# Patient Record
Sex: MALE | Race: WHITE | Employment: FULL TIME | URBAN - METROPOLITAN AREA
[De-identification: names, ages, dates, MRNs, and addresses within clinical notes are randomized per-mention and may not be internally consistent; named-entity substitution may affect disease eponyms.]

---

## 2024-05-03 ENCOUNTER — OFFICE VISIT (OUTPATIENT)
Age: 54
End: 2024-05-03

## 2024-05-03 VITALS
HEIGHT: 74 IN | BODY MASS INDEX: 35.16 KG/M2 | HEART RATE: 86 BPM | RESPIRATION RATE: 16 BRPM | OXYGEN SATURATION: 98 % | SYSTOLIC BLOOD PRESSURE: 125 MMHG | TEMPERATURE: 98.5 F | WEIGHT: 274 LBS | DIASTOLIC BLOOD PRESSURE: 85 MMHG

## 2024-05-03 DIAGNOSIS — G89.29 CHRONIC PAIN OF RIGHT KNEE: Primary | ICD-10-CM

## 2024-05-03 DIAGNOSIS — Z12.5 PROSTATE CANCER SCREENING: ICD-10-CM

## 2024-05-03 DIAGNOSIS — Z76.89 ENCOUNTER TO ESTABLISH CARE: ICD-10-CM

## 2024-05-03 DIAGNOSIS — H57.9: ICD-10-CM

## 2024-05-03 DIAGNOSIS — E29.1 MALE HYPOGONADISM: ICD-10-CM

## 2024-05-03 DIAGNOSIS — M25.561 CHRONIC PAIN OF RIGHT KNEE: Primary | ICD-10-CM

## 2024-05-03 DIAGNOSIS — E78.5 HYPERLIPIDEMIA, UNSPECIFIED HYPERLIPIDEMIA TYPE: ICD-10-CM

## 2024-05-03 DIAGNOSIS — F32.5 MAJOR DEPRESSIVE DISORDER WITH SINGLE EPISODE, IN FULL REMISSION (HCC): ICD-10-CM

## 2024-05-03 DIAGNOSIS — Z00.00 ADULT GENERAL MEDICAL EXAM: ICD-10-CM

## 2024-05-03 RX ORDER — ATORVASTATIN CALCIUM 20 MG/1
20 TABLET, FILM COATED ORAL DAILY
Qty: 90 TABLET | Refills: 3 | Status: SHIPPED | OUTPATIENT
Start: 2024-05-03

## 2024-05-03 RX ORDER — PAROXETINE HYDROCHLORIDE 40 MG/1
40 TABLET, FILM COATED ORAL EVERY MORNING
COMMUNITY
End: 2024-05-03 | Stop reason: SDUPTHER

## 2024-05-03 RX ORDER — CELECOXIB 200 MG/1
200 CAPSULE ORAL DAILY
Qty: 90 CAPSULE | Refills: 3 | Status: SHIPPED | OUTPATIENT
Start: 2024-05-03 | End: 2025-04-28

## 2024-05-03 RX ORDER — PAROXETINE HYDROCHLORIDE 40 MG/1
40 TABLET, FILM COATED ORAL EVERY MORNING
Qty: 90 TABLET | Refills: 3 | Status: SHIPPED | OUTPATIENT
Start: 2024-05-03

## 2024-05-03 RX ORDER — ATORVASTATIN CALCIUM 20 MG/1
20 TABLET, FILM COATED ORAL DAILY
COMMUNITY
End: 2024-05-03 | Stop reason: SDUPTHER

## 2024-05-03 RX ORDER — CELECOXIB 200 MG/1
200 CAPSULE ORAL DAILY
COMMUNITY
End: 2024-05-03 | Stop reason: SDUPTHER

## 2024-05-03 SDOH — ECONOMIC STABILITY: FOOD INSECURITY: WITHIN THE PAST 12 MONTHS, THE FOOD YOU BOUGHT JUST DIDN'T LAST AND YOU DIDN'T HAVE MONEY TO GET MORE.: NEVER TRUE

## 2024-05-03 SDOH — ECONOMIC STABILITY: FOOD INSECURITY: WITHIN THE PAST 12 MONTHS, YOU WORRIED THAT YOUR FOOD WOULD RUN OUT BEFORE YOU GOT MONEY TO BUY MORE.: NEVER TRUE

## 2024-05-03 SDOH — ECONOMIC STABILITY: HOUSING INSECURITY
IN THE LAST 12 MONTHS, WAS THERE A TIME WHEN YOU DID NOT HAVE A STEADY PLACE TO SLEEP OR SLEPT IN A SHELTER (INCLUDING NOW)?: NO

## 2024-05-03 SDOH — ECONOMIC STABILITY: INCOME INSECURITY: HOW HARD IS IT FOR YOU TO PAY FOR THE VERY BASICS LIKE FOOD, HOUSING, MEDICAL CARE, AND HEATING?: NOT HARD AT ALL

## 2024-05-03 ASSESSMENT — PATIENT HEALTH QUESTIONNAIRE - PHQ9
SUM OF ALL RESPONSES TO PHQ QUESTIONS 1-9: 0
SUM OF ALL RESPONSES TO PHQ9 QUESTIONS 1 & 2: 0
1. LITTLE INTEREST OR PLEASURE IN DOING THINGS: NOT AT ALL
SUM OF ALL RESPONSES TO PHQ QUESTIONS 1-9: 0
2. FEELING DOWN, DEPRESSED OR HOPELESS: NOT AT ALL

## 2024-05-03 ASSESSMENT — ENCOUNTER SYMPTOMS
BLOOD IN STOOL: 0
DIARRHEA: 0
SHORTNESS OF BREATH: 0
ABDOMINAL PAIN: 0
COUGH: 0
VOMITING: 0
CONSTIPATION: 0
NAUSEA: 0

## 2024-05-03 NOTE — PROGRESS NOTES
Justo Raya is a 53 y.o. year old male who is a new patient to me today (05/03/24).  He was previously followed by Dr Young at [facility].      Assessment & Plan:   1. Chronic pain of right knee  -     celecoxib (CELEBREX) 200 MG capsule; Take 1 capsule by mouth daily, Disp-90 capsule, R-3Normal  -     [facility] - Belkis Jon MD, Orthopedic Surgery (knee), Fabio ([facility])  2. Major depressive disorder with single episode, in full remission ([facility])  Assessment & Plan:   Well-controlled, continue current medications  Orders:  -     PARoxetine (PAXIL) 40 MG tablet; Take 1 tablet by mouth every morning, Disp-90 tablet, R-3Normal  3. Hyperlipidemia, unspecified hyperlipidemia type  -     atorvastatin (LIPITOR) 20 MG tablet; Take 1 tablet by mouth daily, Disp-90 tablet, R-3Normal  -     Lipid Panel; Future  4. Male hypogonadism  Assessment & Plan:  Will add testosterone level onto labs today. Advised patient that I do not manage hormone replacement - also referred to [facility] to get established  Orders:  -     Testosterone, free, total; Future  -     [facility] - Jonah De Leon MD, UrologyFabio ([facility])  5. Prostate cancer screening  -     PSA Screening; Future  6. Adult general medical exam  -     Comprehensive Metabolic Panel; Future  -     CBC; Future  7. Disorder of extraocular muscle  Assessment & Plan:  Referred to [facility] for further evaluation  8. Encounter to establish care        Return in about 1 year (around 5/3/2025) for Annual Physical.      Subjective:   Patient is here today for evaluation of the following chief complaint(s): New Patient and Eye Problem (Left eye get stuck to the left )    Depression/Anxiety  - Has been on Paxil for several years, says that he feels it controls his symptoms well    DDD  - Lower back  - Has been on NSAIDs for several years    Chronic right knee pain  - He says that he has had his meniscus repaired in his left knee several years ago and

## 2024-05-03 NOTE — PROGRESS NOTES
HMR Product Prescription Guidelines  Decision Free (LCD) 3 + 2 option  800 enrike/day      Basic Prescription Shake Substitutions Allowed More is Better Options     - Three per day     HMR Entrees- Two per day     Two HMR vitamins per day                    [x]  HMR Multigrain Hot          Cereal   One packet cereal =   One packet of         [x]   Chicken Soup  Two packets soup =   one packet of      [x]  Any HMR Shakes    [x]   Chicken Soup    [x]  HMR 70 Plus Puddings     [x]  HMR Entrees & BeneFit          Bars    [x]  HMR Multigrain Hot          Cereal       @-See Product Restrictions for Special Medical Conditions      Name: Denis Grimes  Initial Weight 322.5/BMI:52.85   Start Date:4/2/2018 Height:    Ht Readings from Last 1 Encounters:   03/22/18 5' 5.5\" (1.664 m)      Program:  HMR Goal Weight:     Diet prescription:  DF 3+2 800 shakes Ideal Body Weight:        Date Week Weight Change in lbs Total Weight loss to date Comments Staff    3/22/2018 0 322.5                        Verified name and birth date for privacy precautions.   Chart reviewed in preparation for today's visit.     Chief Complaint   Patient presents with    New Patient    Eye Problem     Left eye get stuck to the left           Health Maintenance Due   Topic    Hepatitis B vaccine (1 of 3 - 3-dose series)    COVID-19 Vaccine (1)    Lipids     Depression Screen     HIV screen     Hepatitis C screen     DTaP/Tdap/Td vaccine (1 - Tdap)    Diabetes screen     Colorectal Cancer Screen     Shingles vaccine (1 of 2)         Wt Readings from Last 3 Encounters:   05/03/24 124.3 kg (274 lb)     Temp Readings from Last 3 Encounters:   05/03/24 98.5 °F (36.9 °C) (Oral)     BP Readings from Last 3 Encounters:   05/03/24 125/85     Pulse Readings from Last 3 Encounters:   05/03/24 86       Social Determinants of Health     Tobacco Use: Low Risk  (5/3/2024)    Patient History     Smoking Tobacco Use: Never     Smokeless Tobacco Use: Never     Passive Exposure: Not on file   Alcohol Use: Not on file   Financial Resource Strain: Low Risk  (5/3/2024)    Overall Financial Resource Strain (CARDIA)     Difficulty of Paying Living Expenses: Not hard at all   Food Insecurity: No Food Insecurity (5/3/2024)    Hunger Vital Sign     Worried About Running Out of Food in the Last Year: Never true     Ran Out of Food in the Last Year: Never true   Transportation Needs: Unknown (5/3/2024)    PRAPARE - Transportation     Lack of Transportation (Medical): Not on file     Lack of Transportation (Non-Medical): No   Physical Activity: Not on file   Stress: Not on file   Social Connections: Not on file   Intimate Partner Violence: Not on file   Depression: Not at risk (5/3/2024)    PHQ-2     PHQ-2 Score: 0   Housing Stability: Unknown (5/3/2024)    Housing Stability Vital Sign     Unable to Pay for Housing in the Last Year: Not on file     Number of Places Lived in the Last Year: Not on file     Unstable Housing in the Last Year: No   Interpersonal

## 2024-05-13 DIAGNOSIS — E29.1 MALE HYPOGONADISM: ICD-10-CM

## 2024-05-13 DIAGNOSIS — Z00.00 ADULT GENERAL MEDICAL EXAM: ICD-10-CM

## 2024-05-13 DIAGNOSIS — E78.5 HYPERLIPIDEMIA, UNSPECIFIED HYPERLIPIDEMIA TYPE: ICD-10-CM

## 2024-05-13 DIAGNOSIS — Z12.5 PROSTATE CANCER SCREENING: ICD-10-CM

## 2024-05-13 LAB
ALBUMIN SERPL-MCNC: 3.8 G/DL (ref 3.5–5)
ALBUMIN/GLOB SERPL: 1 (ref 1.1–2.2)
ALP SERPL-CCNC: 150 U/L (ref 45–117)
ALT SERPL-CCNC: 76 U/L (ref 12–78)
ANION GAP SERPL CALC-SCNC: 6 MMOL/L (ref 5–15)
AST SERPL-CCNC: 33 U/L (ref 15–37)
BILIRUB SERPL-MCNC: 0.6 MG/DL (ref 0.2–1)
BUN SERPL-MCNC: 13 MG/DL (ref 6–20)
BUN/CREAT SERPL: 14 (ref 12–20)
CALCIUM SERPL-MCNC: 9.4 MG/DL (ref 8.5–10.1)
CHLORIDE SERPL-SCNC: 103 MMOL/L (ref 97–108)
CHOLEST SERPL-MCNC: 175 MG/DL
CO2 SERPL-SCNC: 28 MMOL/L (ref 21–32)
CREAT SERPL-MCNC: 0.94 MG/DL (ref 0.7–1.3)
ERYTHROCYTE [DISTWIDTH] IN BLOOD BY AUTOMATED COUNT: 13.2 % (ref 11.5–14.5)
GLOBULIN SER CALC-MCNC: 3.8 G/DL (ref 2–4)
GLUCOSE SERPL-MCNC: 108 MG/DL (ref 65–100)
HCT VFR BLD AUTO: 47.4 % (ref 36.6–50.3)
HDLC SERPL-MCNC: 40 MG/DL
HDLC SERPL: 4.4 (ref 0–5)
HGB BLD-MCNC: 15.4 G/DL (ref 12.1–17)
LDLC SERPL CALC-MCNC: 87.8 MG/DL (ref 0–100)
MCH RBC QN AUTO: 29.5 PG (ref 26–34)
MCHC RBC AUTO-ENTMCNC: 32.5 G/DL (ref 30–36.5)
MCV RBC AUTO: 90.8 FL (ref 80–99)
NRBC # BLD: 0 K/UL (ref 0–0.01)
NRBC BLD-RTO: 0 PER 100 WBC
PLATELET # BLD AUTO: 323 K/UL (ref 150–400)
PMV BLD AUTO: 9.7 FL (ref 8.9–12.9)
POTASSIUM SERPL-SCNC: 4.1 MMOL/L (ref 3.5–5.1)
PROT SERPL-MCNC: 7.6 G/DL (ref 6.4–8.2)
PSA SERPL-MCNC: 2 NG/ML (ref 0.01–4)
RBC # BLD AUTO: 5.22 M/UL (ref 4.1–5.7)
SODIUM SERPL-SCNC: 137 MMOL/L (ref 136–145)
TRIGL SERPL-MCNC: 236 MG/DL
VLDLC SERPL CALC-MCNC: 47.2 MG/DL
WBC # BLD AUTO: 6.4 K/UL (ref 4.1–11.1)

## 2024-05-18 LAB
TESTOST FREE SERPL-MCNC: 5.6 PG/ML (ref 7.2–24)
TESTOST SERPL-MCNC: 157 NG/DL (ref 264–916)

## 2024-08-01 ENCOUNTER — OFFICE VISIT (OUTPATIENT)
Age: 54
End: 2024-08-01

## 2024-08-01 VITALS
HEIGHT: 74 IN | HEART RATE: 82 BPM | WEIGHT: 273 LBS | BODY MASS INDEX: 35.04 KG/M2 | DIASTOLIC BLOOD PRESSURE: 86 MMHG | TEMPERATURE: 97 F | SYSTOLIC BLOOD PRESSURE: 131 MMHG | RESPIRATION RATE: 16 BRPM | OXYGEN SATURATION: 96 %

## 2024-08-01 DIAGNOSIS — Z01.818 PREOP EXAMINATION: Primary | ICD-10-CM

## 2024-08-01 DIAGNOSIS — N52.9 ERECTILE DYSFUNCTION, UNSPECIFIED ERECTILE DYSFUNCTION TYPE: ICD-10-CM

## 2024-08-01 DIAGNOSIS — H57.9: ICD-10-CM

## 2024-08-01 LAB
ANION GAP SERPL CALC-SCNC: 2 MMOL/L (ref 5–15)
BUN SERPL-MCNC: 9 MG/DL (ref 6–20)
BUN/CREAT SERPL: 10 (ref 12–20)
CALCIUM SERPL-MCNC: 9.5 MG/DL (ref 8.5–10.1)
CHLORIDE SERPL-SCNC: 107 MMOL/L (ref 97–108)
CO2 SERPL-SCNC: 31 MMOL/L (ref 21–32)
CREAT SERPL-MCNC: 0.89 MG/DL (ref 0.7–1.3)
GLUCOSE SERPL-MCNC: 104 MG/DL (ref 65–100)
POTASSIUM SERPL-SCNC: 4.4 MMOL/L (ref 3.5–5.1)
SODIUM SERPL-SCNC: 140 MMOL/L (ref 136–145)

## 2024-08-01 RX ORDER — TADALAFIL 5 MG/1
5 TABLET ORAL PRN
Qty: 30 TABLET | Refills: 1 | Status: SHIPPED | OUTPATIENT
Start: 2024-08-01

## 2024-08-01 ASSESSMENT — PATIENT HEALTH QUESTIONNAIRE - PHQ9
10. IF YOU CHECKED OFF ANY PROBLEMS, HOW DIFFICULT HAVE THESE PROBLEMS MADE IT FOR YOU TO DO YOUR WORK, TAKE CARE OF THINGS AT HOME, OR GET ALONG WITH OTHER PEOPLE: NOT DIFFICULT AT ALL
7. TROUBLE CONCENTRATING ON THINGS, SUCH AS READING THE NEWSPAPER OR WATCHING TELEVISION: NOT AT ALL
2. FEELING DOWN, DEPRESSED OR HOPELESS: NOT AT ALL
9. THOUGHTS THAT YOU WOULD BE BETTER OFF DEAD, OR OF HURTING YOURSELF: NOT AT ALL
SUM OF ALL RESPONSES TO PHQ QUESTIONS 1-9: 0
SUM OF ALL RESPONSES TO PHQ9 QUESTIONS 1 & 2: 0
5. POOR APPETITE OR OVEREATING: NOT AT ALL
3. TROUBLE FALLING OR STAYING ASLEEP: NOT AT ALL
SUM OF ALL RESPONSES TO PHQ QUESTIONS 1-9: 0
8. MOVING OR SPEAKING SO SLOWLY THAT OTHER PEOPLE COULD HAVE NOTICED. OR THE OPPOSITE, BEING SO FIGETY OR RESTLESS THAT YOU HAVE BEEN MOVING AROUND A LOT MORE THAN USUAL: NOT AT ALL
1. LITTLE INTEREST OR PLEASURE IN DOING THINGS: NOT AT ALL
SUM OF ALL RESPONSES TO PHQ QUESTIONS 1-9: 0
6. FEELING BAD ABOUT YOURSELF - OR THAT YOU ARE A FAILURE OR HAVE LET YOURSELF OR YOUR FAMILY DOWN: NOT AT ALL
SUM OF ALL RESPONSES TO PHQ QUESTIONS 1-9: 0
4. FEELING TIRED OR HAVING LITTLE ENERGY: NOT AT ALL

## 2024-08-01 ASSESSMENT — ENCOUNTER SYMPTOMS
DIARRHEA: 0
ABDOMINAL PAIN: 0
NAUSEA: 0
BLOOD IN STOOL: 0
VOMITING: 0
COUGH: 0
CONSTIPATION: 0
SHORTNESS OF BREATH: 0

## 2024-08-01 NOTE — PROGRESS NOTES
Preoperative Evaluation:   Justo Raya is a 53 y.o. male (1970) who presents for preoperative evaluation.       Procedure/Surgery: Left medial and lateral rectus resection   Date of Procedure/Surgery: 08/22/2024  Surgeon: Dr. Thomas  [facility]/Surgical Facility: [facility]  Primary Physician: Boubacar Hand DO     Reason for surgery: Left extraocular muscle dysfunction      Pre-Operative Risk Assessment Using 2014 ACC/AHA Guidelines   Emergent procedure: No  Active Cardiac Condition including decompensated HF, Arrhythmia, MI <3 weeks, severe valve disease: No  Risk Level of Procedure: Low Risk (endoscopy, superficial skin, breast, ambulatory, or cataract, etc.)  Revised Cardiac Risk Index Risk factors: None    Measurement of Exercise Tolerance before Surgery is >4 METS (climbing > 1 flight of stairs without stopping, walking up hill > 1-2 blocks, scrubbing floors, moving furniture, golf, bowling, dancing or tennis, or running short distance): Yes      Hx RENETTA: No  Hx respiratory disease or smoking: No  Latex Allergy: No  Recent use of: No recent use of aspirin (ASA), NSAIDS or steroids  Anesthesia Complications: None  History of abnormal bleeding : None      EKG: EKG FINDINGS - normal EKG, normal sinus rhythm  CXR: n/a  Labs: BMP ordered today      Medication Recommendations: NONE    Other issues addressed today:   - Patient requests refill of his previous Cialis rx, was on 5mg PRN         Prior to Admission medications    Medication Sig Start Date End Date Taking? Authorizing Provider   tadalafil (CIALIS) 5 MG tablet Take 1 tablet by mouth as needed for Erectile Dysfunction 8/1/24  Yes Boubacar Hand DO   PARoxetine (PAXIL) 40 MG tablet Take 1 tablet by mouth every morning 5/3/24  Yes Boubacar Hand DO   celecoxib (CELEBREX) 200 MG capsule Take 1 capsule by mouth daily 5/3/24 4/28/25 Yes Boubacar Hand DO   atorvastatin (LIPITOR) 20 MG tablet Take 1 tablet by mouth daily 5/3/24

## 2024-09-10 ENCOUNTER — TELEPHONE (OUTPATIENT)
Age: 54
End: 2024-09-10

## 2025-02-13 ENCOUNTER — TELEPHONE (OUTPATIENT)
Age: 55
End: 2025-02-13

## 2025-02-13 NOTE — TELEPHONE ENCOUNTER
Patient left voicemail with office on 02/05/2025 to reschedule an appointment. Returned patient's call and left voicemail to call office to reschedule.

## 2025-02-19 ENCOUNTER — CLINICAL DOCUMENTATION (OUTPATIENT)
Age: 55
End: 2025-02-19

## 2025-02-19 NOTE — PROGRESS NOTES
Patient called to schedule an appt. Patient scheduled with MD Sorto at [facility]

## 2025-02-25 ENCOUNTER — OFFICE VISIT (OUTPATIENT)
Age: 55
End: 2025-02-25

## 2025-02-25 VITALS
DIASTOLIC BLOOD PRESSURE: 94 MMHG | TEMPERATURE: 97.5 F | HEIGHT: 74 IN | RESPIRATION RATE: 16 BRPM | SYSTOLIC BLOOD PRESSURE: 148 MMHG | OXYGEN SATURATION: 98 % | BODY MASS INDEX: 35.45 KG/M2 | WEIGHT: 276.2 LBS | HEART RATE: 88 BPM

## 2025-02-25 DIAGNOSIS — Z83.2 FAMILY HISTORY OF FACTOR V LEIDEN MUTATION: ICD-10-CM

## 2025-02-25 DIAGNOSIS — Z11.4 SCREENING FOR HIV (HUMAN IMMUNODEFICIENCY VIRUS): ICD-10-CM

## 2025-02-25 DIAGNOSIS — Z11.4 SCREENING FOR HIV (HUMAN IMMUNODEFICIENCY VIRUS): Primary | ICD-10-CM

## 2025-02-25 DIAGNOSIS — Z01.818 PRE-OP EXAM: ICD-10-CM

## 2025-02-25 RX ORDER — ZOSTER VACCINE RECOMBINANT, ADJUVANTED 50 MCG/0.5
0.5 KIT INTRAMUSCULAR SEE ADMIN INSTRUCTIONS
Qty: 0.5 ML | Refills: 0 | Status: SHIPPED | OUTPATIENT
Start: 2025-02-25 | End: 2025-08-24

## 2025-02-25 ASSESSMENT — PATIENT HEALTH QUESTIONNAIRE - PHQ9
SUM OF ALL RESPONSES TO PHQ QUESTIONS 1-9: 0
2. FEELING DOWN, DEPRESSED OR HOPELESS: NOT AT ALL

## 2025-02-25 NOTE — PROGRESS NOTES
Date of Exam: 2025    Justo Raya is a 54 y.o. male (:1970) who presents for preoperative evaluation.   Procedure/Surgery:right total hip replacement  Date of Procedure/Surgery: 3/6/25  Surgeon: [facility]/Surgical Facility: [facility] the [facility]  Primary Physician: Boubacar Hand DO  Latex Allergy: No  Patient also requests genetic referral for family history of factor V and other syndrome  Current Outpatient Medications   Medication Sig Dispense Refill    tadalafil (CIALIS) 5 MG tablet Take 1 tablet by mouth as needed for Erectile Dysfunction 30 tablet 1    PARoxetine (PAXIL) 40 MG tablet Take 1 tablet by mouth every morning 90 tablet 3    celecoxib (CELEBREX) 200 MG capsule Take 1 capsule by mouth daily 90 capsule 3    atorvastatin (LIPITOR) 20 MG tablet Take 1 tablet by mouth daily 90 tablet 3     No current facility-administered medications for this visit.        History reviewed. No pertinent past medical history.     Past Surgical History:   Procedure Laterality Date    CHOLECYSTECTOMY      LIVER RESECTION      childhood, secondary to trauma & liver laceration    ORTHOPEDIC SURGERY      R knee torn meniscus        Tetanus up to date: last tetanus booster within 10 years ago      Anesthesia Complications: no  History of abnormal bleeding : no  History of Blood Transfusions: No  Health Care Directive or Living Will: No    REVIEW OF SYSTEMS:  Pertinent items are noted in HPI.    EXAM:   BP (!) 148/94   Pulse 88   Temp 97.5 °F (36.4 °C) (Temporal)   Resp 16   Ht 1.88 m (6' 2\")   Wt 125.3 kg (276 lb 3.2 oz)   SpO2 98%   BMI 35.46 kg/m²     Physical Examination: General appearance - alert, well appearing, and in no distress  Mental status - alert, oriented to person, place, and time  Eyes - pupils equal and reactive, extraocular eye movements intact  Ears - bilateral TM's and external ear canals normal  Nose - normal and patent, no erythema, discharge or polyps  Mouth - mucous membranes

## 2025-02-27 LAB
ALBUMIN SERPL-MCNC: 4 G/DL (ref 3.5–5)
ALBUMIN SERPL-MCNC: 4.1 G/DL (ref 3.5–5)
ALBUMIN/GLOB SERPL: 1.1 (ref 1.1–2.2)
ALP SERPL-CCNC: 162 U/L (ref 45–117)
ALT SERPL-CCNC: 50 U/L (ref 12–78)
ANION GAP SERPL CALC-SCNC: 4 MMOL/L (ref 2–12)
AST SERPL-CCNC: 25 U/L (ref 15–37)
BASOPHILS # BLD: 0.05 K/UL (ref 0–0.1)
BASOPHILS NFR BLD: 0.9 % (ref 0–1)
BILIRUB SERPL-MCNC: 0.5 MG/DL (ref 0.2–1)
BUN SERPL-MCNC: 10 MG/DL (ref 6–20)
BUN/CREAT SERPL: 12 (ref 12–20)
CALCIUM SERPL-MCNC: 9.6 MG/DL (ref 8.5–10.1)
CHLORIDE SERPL-SCNC: 105 MMOL/L (ref 97–108)
CO2 SERPL-SCNC: 29 MMOL/L (ref 21–32)
CREAT SERPL-MCNC: 0.83 MG/DL (ref 0.7–1.3)
CRP SERPL-MCNC: 0.33 MG/DL (ref 0–0.3)
DIFFERENTIAL METHOD BLD: NORMAL
EOSINOPHIL # BLD: 0.09 K/UL (ref 0–0.4)
EOSINOPHIL NFR BLD: 1.6 % (ref 0–7)
ERYTHROCYTE [DISTWIDTH] IN BLOOD BY AUTOMATED COUNT: 13.3 % (ref 11.5–14.5)
ERYTHROCYTE [SEDIMENTATION RATE] IN BLOOD: 12 MM/HR (ref 0–20)
EST. AVERAGE GLUCOSE BLD GHB EST-MCNC: 120 MG/DL
GLOBULIN SER CALC-MCNC: 3.5 G/DL (ref 2–4)
GLUCOSE SERPL-MCNC: 102 MG/DL (ref 65–100)
HBA1C MFR BLD: 5.8 % (ref 4–5.6)
HCT VFR BLD AUTO: 47.1 % (ref 36.6–50.3)
HGB BLD-MCNC: 15.5 G/DL (ref 12.1–17)
HIV 1+2 AB+HIV1 P24 AG SERPL QL IA: NONREACTIVE
HIV 1/2 RESULT COMMENT: NORMAL
IMM GRANULOCYTES # BLD AUTO: 0.01 K/UL (ref 0–0.04)
IMM GRANULOCYTES NFR BLD AUTO: 0.2 % (ref 0–0.5)
INR PPP: 0.9 (ref 0.9–1.1)
LYMPHOCYTES # BLD: 2.52 K/UL (ref 0.8–3.5)
LYMPHOCYTES NFR BLD: 43.5 % (ref 12–49)
MCH RBC QN AUTO: 29.6 PG (ref 26–34)
MCHC RBC AUTO-ENTMCNC: 32.9 G/DL (ref 30–36.5)
MCV RBC AUTO: 89.9 FL (ref 80–99)
MONOCYTES # BLD: 0.55 K/UL (ref 0–1)
MONOCYTES NFR BLD: 9.5 % (ref 5–13)
NEUTS SEG # BLD: 2.57 K/UL (ref 1.8–8)
NEUTS SEG NFR BLD: 44.3 % (ref 32–75)
NRBC # BLD: 0 K/UL (ref 0–0.01)
NRBC BLD-RTO: 0 PER 100 WBC
PLATELET # BLD AUTO: 318 K/UL (ref 150–400)
PMV BLD AUTO: 9.9 FL (ref 8.9–12.9)
POTASSIUM SERPL-SCNC: 4.6 MMOL/L (ref 3.5–5.1)
PROT SERPL-MCNC: 7.5 G/DL (ref 6.4–8.2)
PROTHROMBIN TIME: 10.1 SEC (ref 9.2–11.2)
RBC # BLD AUTO: 5.24 M/UL (ref 4.1–5.7)
SODIUM SERPL-SCNC: 138 MMOL/L (ref 136–145)
WBC # BLD AUTO: 5.8 K/UL (ref 4.1–11.1)

## 2025-02-28 LAB — TRANSFERRIN SERPL-MCNC: 272 MG/DL (ref 177–329)

## 2025-03-20 SDOH — ECONOMIC STABILITY: INCOME INSECURITY: IN THE LAST 12 MONTHS, WAS THERE A TIME WHEN YOU WERE NOT ABLE TO PAY THE MORTGAGE OR RENT ON TIME?: NO

## 2025-03-20 SDOH — ECONOMIC STABILITY: FOOD INSECURITY: WITHIN THE PAST 12 MONTHS, YOU WORRIED THAT YOUR FOOD WOULD RUN OUT BEFORE YOU GOT MONEY TO BUY MORE.: NEVER TRUE

## 2025-03-20 SDOH — ECONOMIC STABILITY: FOOD INSECURITY: WITHIN THE PAST 12 MONTHS, THE FOOD YOU BOUGHT JUST DIDN'T LAST AND YOU DIDN'T HAVE MONEY TO GET MORE.: NEVER TRUE

## 2025-03-20 SDOH — ECONOMIC STABILITY: TRANSPORTATION INSECURITY
IN THE PAST 12 MONTHS, HAS LACK OF TRANSPORTATION KEPT YOU FROM MEETINGS, WORK, OR FROM GETTING THINGS NEEDED FOR DAILY LIVING?: NO

## 2025-03-20 SDOH — ECONOMIC STABILITY: TRANSPORTATION INSECURITY
IN THE PAST 12 MONTHS, HAS THE LACK OF TRANSPORTATION KEPT YOU FROM MEDICAL APPOINTMENTS OR FROM GETTING MEDICATIONS?: NO

## 2025-03-21 ENCOUNTER — OFFICE VISIT (OUTPATIENT)
Age: 55
End: 2025-03-21
Payer: COMMERCIAL

## 2025-03-21 VITALS
DIASTOLIC BLOOD PRESSURE: 83 MMHG | HEIGHT: 72 IN | WEIGHT: 277 LBS | TEMPERATURE: 97.5 F | HEART RATE: 96 BPM | OXYGEN SATURATION: 95 % | BODY MASS INDEX: 37.52 KG/M2 | RESPIRATION RATE: 16 BRPM | SYSTOLIC BLOOD PRESSURE: 120 MMHG

## 2025-03-21 DIAGNOSIS — Z96.649 STATUS POST HIP REPLACEMENT, UNSPECIFIED LATERALITY: ICD-10-CM

## 2025-03-21 DIAGNOSIS — Z23 ENCOUNTER FOR IMMUNIZATION: ICD-10-CM

## 2025-03-21 DIAGNOSIS — R73.03 PRE-DIABETES: Primary | ICD-10-CM

## 2025-03-21 PROCEDURE — 99214 OFFICE O/P EST MOD 30 MIN: CPT | Performed by: STUDENT IN AN ORGANIZED HEALTH CARE EDUCATION/TRAINING PROGRAM

## 2025-03-21 NOTE — PROGRESS NOTES
Justo Raya is a 54 y.o. year old male who presents today (03/21/25) for follow-up/ Follow Up After Procedure      Assessment & Plan:     Assessment & Plan  1. S/p hip replacement  - Continue anti-inflammatories, pain medication, and Tylenol.  - Advised to continue home exercises.    2. Prediabetes:   - A1c levels were slightly elevated in February 2025.  - Advised dietary modifications, including reducing added sugar and switching to diet Coke.  - Incorporate regular exercise once hip condition improves.  - Repeat labs in 6 months.    3. Health maintenance.  - Advised to receive shingles vaccine at the pharmacy counter, with the second dose 2 to 6 months after the first.  - Ensure tetanus vaccine is up to date, as it has been more than 5 years since the last dose.    Follow-up  - Follow up in 6 months for a physical examination and lab work.    Subjective:     History of Present Illness  The patient presents for a postoperative visit, accompanied by his wife.    Postoperative Hip Replacement and Groin Pain  He underwent a hip replacement due to osteomyelitis and has persistent groin pain radiating down his leg. He consulted [facility] this morning, where imaging showed good healing progress. The residual pain is normal for recovery, but he is concerned about the ongoing pain and expected more relief. He adheres to home exercises prescribed by his physical therapist, which he finds beneficial. His mobility is satisfactory, but he notes persistent pain and bruising along his legs. His pain management includes anti-inflammatories, renewed pain medication, and Tylenol. He transitioned from tramadol to another analgesic, which he found effective.    Prediabetes  He was not previously informed of being prediabetic. He occasionally consumes Coke and has difficulty maintaining a healthy diet. He is uncertain about appropriate food choices and has not considered consulting a dietitian.    Shingles Vaccine  He is

## 2025-05-23 ENCOUNTER — TELEPHONE (OUTPATIENT)
Age: 55
End: 2025-05-23

## 2025-06-09 DIAGNOSIS — E78.5 HYPERLIPIDEMIA, UNSPECIFIED HYPERLIPIDEMIA TYPE: ICD-10-CM

## 2025-06-09 DIAGNOSIS — F32.5 MAJOR DEPRESSIVE DISORDER WITH SINGLE EPISODE, IN FULL REMISSION: ICD-10-CM

## 2025-06-09 RX ORDER — PAROXETINE 40 MG/1
40 TABLET, FILM COATED ORAL EVERY MORNING
Qty: 90 TABLET | Refills: 3 | Status: SHIPPED | OUTPATIENT
Start: 2025-06-09

## 2025-06-09 RX ORDER — ATORVASTATIN CALCIUM 20 MG/1
20 TABLET, FILM COATED ORAL DAILY
Qty: 90 TABLET | Refills: 3 | Status: SHIPPED | OUTPATIENT
Start: 2025-06-09

## 2025-06-09 NOTE — TELEPHONE ENCOUNTER
Last office visit 3/21/25  Next Appt  With Internal Medicine (Boubacar Hand DO)  09/29/2025 at 9:00 AM    Last fill on lipitor 5/3/24 #90 with 3 additional refills     Last fill on paxil 05/03/24 #90 with 3 additional refills

## 2025-06-18 ENCOUNTER — OFFICE VISIT (OUTPATIENT)
Age: 55
End: 2025-06-18
Payer: COMMERCIAL

## 2025-06-18 VITALS
HEIGHT: 74 IN | HEART RATE: 105 BPM | TEMPERATURE: 98.6 F | DIASTOLIC BLOOD PRESSURE: 98 MMHG | SYSTOLIC BLOOD PRESSURE: 141 MMHG | BODY MASS INDEX: 35.55 KG/M2 | WEIGHT: 277 LBS | OXYGEN SATURATION: 98 %

## 2025-06-18 DIAGNOSIS — G47.33 OSA (OBSTRUCTIVE SLEEP APNEA): Primary | ICD-10-CM

## 2025-06-18 DIAGNOSIS — Z86.59 H/O ANXIETY DISORDER: ICD-10-CM

## 2025-06-18 PROCEDURE — 99204 OFFICE O/P NEW MOD 45 MIN: CPT | Performed by: INTERNAL MEDICINE

## 2025-06-18 ASSESSMENT — SLEEP AND FATIGUE QUESTIONNAIRES
HOW LIKELY ARE YOU TO NOD OFF OR FALL ASLEEP WHILE SITTING INACTIVE IN A PUBLIC PLACE: WOULD NEVER DOZE
HOW LIKELY ARE YOU TO NOD OFF OR FALL ASLEEP WHILE SITTING AND READING: SLIGHT CHANCE OF DOZING
HOW LIKELY ARE YOU TO NOD OFF OR FALL ASLEEP WHILE LYING DOWN TO REST IN THE AFTERNOON WHEN CIRCUMSTANCES PERMIT: SLIGHT CHANCE OF DOZING
ESS TOTAL SCORE: 4
HOW LIKELY ARE YOU TO NOD OFF OR FALL ASLEEP IN A CAR, WHILE STOPPED FOR A FEW MINUTES IN TRAFFIC: WOULD NEVER DOZE
HOW LIKELY ARE YOU TO NOD OFF OR FALL ASLEEP WHILE SITTING QUIETLY AFTER LUNCH WITHOUT ALCOHOL: WOULD NEVER DOZE
HOW LIKELY ARE YOU TO NOD OFF OR FALL ASLEEP WHILE WATCHING TV: SLIGHT CHANCE OF DOZING
HOW LIKELY ARE YOU TO NOD OFF OR FALL ASLEEP WHEN YOU ARE A PASSENGER IN A CAR FOR AN HOUR WITHOUT A BREAK: SLIGHT CHANCE OF DOZING
HOW LIKELY ARE YOU TO NOD OFF OR FALL ASLEEP WHILE SITTING AND TALKING TO SOMEONE: WOULD NEVER DOZE

## 2025-06-18 NOTE — PROGRESS NOTES
continue to monitor his mood and follow up with his care provider for reevaluation/adjustment of medications if warranted.  I have reviewed the relationship between mood as it relates to sleep-disordered breathing.    3. Recommended a dedicated weight loss program through appropriate diet and exercise regimen as significant weight reduction has been shown to reduce severity of obstructive sleep apnea.     SUBJECTIVE/OBJECTIVE:    Justo Raya is an 54 y.o. male referred for evaluation for a sleep disorder. He complains of snoring associated with awakening in the middle of the night because of snorting and teeth grinding.  Symptoms began a few years ago, he [place] with [place]ETTA [place] and has been on PAP therapy since that time.  See media for download (nasal pillows / non-heated tube) He usually can fall asleep in 10-15 minutes.  Family or house members do not note snoring.     Livingston Sleepiness Score: 4  Modified F.O.S.Q. Score Total / 2: 18.5      He denies of symptoms indicative of cataplexy, sleep paralysis or sleep related hallucinations.    He denies of a history of unusual movements occurring during sleep.    Justo Raya does not wake up frequently at night. He is not bothered by waking up too early and left unable to get back to sleep. He actually sleeps about 9 hours at night and wakes up about 3 times during the night. He does not work shifts:  .   Justo indicates he does not get too little sleep at night. His bedtime is 2000. He awakens at 0730. He does take naps. He takes 2 naps a week lasting 1, Hour(s). He has the following observed behaviors: Loud snoring, Bedwetting, Pauses in breathing, Grinding teeth;  .  Other remarks:      No Known Allergies      Current Outpatient Medications:     atorvastatin (LIPITOR) 20 MG tablet, TAKE 1 TABLET BY MOUTH DAILY, Disp: 90 tablet, Rfl: 3    PARoxetine (PAXIL) 40 MG tablet, TAKE 1 TABLET BY MOUTH EVERY MORNING, Disp: 90 tablet, Rfl: 3

## 2025-07-03 ENCOUNTER — CLINICAL DOCUMENTATION (OUTPATIENT)
Age: 55
End: 2025-07-03

## 2025-08-12 DIAGNOSIS — N52.9 ERECTILE DYSFUNCTION, UNSPECIFIED ERECTILE DYSFUNCTION TYPE: ICD-10-CM

## 2025-08-12 RX ORDER — TADALAFIL 5 MG/1
5 TABLET ORAL DAILY PRN
Qty: 30 TABLET | Refills: 1 | Status: SHIPPED | OUTPATIENT
Start: 2025-08-12